# Patient Record
Sex: FEMALE | URBAN - METROPOLITAN AREA
[De-identification: names, ages, dates, MRNs, and addresses within clinical notes are randomized per-mention and may not be internally consistent; named-entity substitution may affect disease eponyms.]

---

## 2021-04-08 ENCOUNTER — TELEPHONE (OUTPATIENT)
Dept: OBGYN CLINIC | Facility: CLINIC | Age: 51
End: 2021-04-08

## 2021-04-08 DIAGNOSIS — N95.2 ATROPHY OF VAGINA: Primary | ICD-10-CM

## 2021-04-08 NOTE — TELEPHONE ENCOUNTER
Pt called to request to have a new scipt processed for E-string and sent to [facility] josue Poon  Pt would like to use coupon and unable to process coupon use through [employer]

## 2021-10-13 ENCOUNTER — TELEPHONE (OUTPATIENT)
Dept: OBGYN CLINIC | Facility: CLINIC | Age: 51
End: 2021-10-13

## 2021-10-13 DIAGNOSIS — N95.2 ATROPHY OF VAGINA: Primary | ICD-10-CM

## 2021-10-13 RX ORDER — ESTRADIOL 0.1 MG/G
1 CREAM VAGINAL 2 TIMES WEEKLY
Qty: 42.5 G | Refills: 2 | Status: SHIPPED | OUTPATIENT
Start: 2021-10-14

## 2022-11-17 PROBLEM — Z13.71 BRCA NEGATIVE: Status: ACTIVE | Noted: 2018-01-01

## 2022-11-17 NOTE — PROGRESS NOTES
Assessment/Plan:    Encounter for gynecological examination (general) (routine) without abnormal findings  Here for well check  Had allergic reactions to Macrobid and Cipro this year with UTIs  Normal breast and pelvic exams  Pap 2020 neg/HPV neg; due by   Mammo order given, last 2021  Colonoscopy 2019, due        Diagnoses and all orders for this visit:    Encounter for gynecological examination (general) (routine) without abnormal findings    Breast cancer screening by mammogram  -     Mammo screening bilateral w 3d & cad; Future    Other orders  -     Liquid-based pap, screening  -     esomeprazole (NexIUM) 20 mg capsule  -     Multiple Vitamins-Minerals (Multivitamin Women) TABS; Take by mouth  -     saccharomyces boulardii (FLORASTOR) 250 mg capsule; Take 250 mg by mouth 2 (two) times a day  -     Calcium Carb-Cholecalciferol (Calcium+D3) 600-20 MG-MCG TABS; Take by mouth          Subjective:      Patient ID: Nereida Grewal is a 45 y o  female  HPI Here for well check  The following portions of the patient's history were reviewed and updated as appropriate:   She  has a past medical history of Asthma (Teenager), BRCA negative (), Fibroid, GERD (gastroesophageal reflux disease), Migraine, and Miscarriage (1997)  She   Patient Active Problem List    Diagnosis Date Noted   • Encounter for gynecological examination (general) (routine) without abnormal findings 2022   • BRCA negative      She  has a past surgical history that includes  section (2003); Myomectomy (2001); Dilation and curettage of uterus (); Dilation and evacuation (); Colonoscopy (); and Mammo (historical) (Bilateral, 06/10/2020)  Her family history includes Osteoarthritis in her father; Uterine cancer (age of onset: 28) in her mother; Uterine cancer (age of onset: 61) in her maternal grandmother  She  reports that she has never smoked   She has never used smokeless tobacco  She reports current alcohol use  She reports that she does not use drugs  Current Outpatient Medications   Medication Sig Dispense Refill   • Calcium Carb-Cholecalciferol (Calcium+D3) 600-20 MG-MCG TABS Take by mouth     • esomeprazole (NexIUM) 20 mg capsule      • Multiple Vitamins-Minerals (Multivitamin Women) TABS Take by mouth     • saccharomyces boulardii (FLORASTOR) 250 mg capsule Take 250 mg by mouth 2 (two) times a day       No current facility-administered medications for this visit  She is allergic to bee venom, ciprofloxin hcl [ciprofloxacin], cortisone, diflunisal, and nitrofurantoin       Review of Systems  No PMB, breast, bladder, bowel changes   No new persistent pain, bloating, early satiety or pelvic pressure    Objective:      /68 (BP Location: Left arm, Patient Position: Sitting, Cuff Size: Standard)   Ht 5' 2" (1 575 m)   Wt 59 4 kg (131 lb)   Breastfeeding No   BMI 23 96 kg/m²          Physical Exam    General appearance: no distress, pleasant  Neck: thyroid without nodules or thyromegaly, no palpable adenopathy  Lymph nodes: no palpable adenopathy  Breasts: no masses, nodes or skin changes  Abdomen: soft, non tender, no palpable masses  Pelvic exam: normal atrophic external genitalia, urethral meatus normal, vagina atrophic without lesions, cervix atrophic without lesions, uterus small, non tender, no adnexal masses, non tender  Rectal exam: normal sphincter tone, no masses, RV confirms above

## 2022-11-18 ENCOUNTER — ANNUAL EXAM (OUTPATIENT)
Dept: OBGYN CLINIC | Facility: CLINIC | Age: 52
End: 2022-11-18

## 2022-11-18 VITALS
HEIGHT: 62 IN | DIASTOLIC BLOOD PRESSURE: 68 MMHG | BODY MASS INDEX: 24.11 KG/M2 | SYSTOLIC BLOOD PRESSURE: 120 MMHG | WEIGHT: 131 LBS

## 2022-11-18 DIAGNOSIS — Z01.419 ENCOUNTER FOR GYNECOLOGICAL EXAMINATION (GENERAL) (ROUTINE) WITHOUT ABNORMAL FINDINGS: Primary | ICD-10-CM

## 2022-11-18 DIAGNOSIS — Z12.31 BREAST CANCER SCREENING BY MAMMOGRAM: ICD-10-CM

## 2022-11-18 RX ORDER — SACCHAROMYCES BOULARDII 250 MG
250 CAPSULE ORAL 2 TIMES DAILY
COMMUNITY

## 2022-11-18 RX ORDER — PHENOL 1.4 %
AEROSOL, SPRAY (ML) MUCOUS MEMBRANE
COMMUNITY

## 2022-11-18 RX ORDER — GINGER ROOT/GINGER ROOT EXT 262.5 MG
CAPSULE ORAL
COMMUNITY

## 2022-11-18 NOTE — ASSESSMENT & PLAN NOTE
Here for well check  Had allergic reactions to Macrobid and Cipro this year with UTIs  Normal breast and pelvic exams     Pap 1/2020 neg/HPV neg; due by 2025  Mammo order given, last 7/2021  Colonoscopy 2019, due 2029

## 2022-11-18 NOTE — LETTER
November 18, 2022     Michael Gaines MD  [facility] [address]    Patient: Dipesh Villaseñor   YOB: 1970   Date of Visit: 11/18/2022       Dear Dr Arthur Hawkins: Thank you for referring Dipesh Villaseñor to me for evaluation  Below are my notes for this consultation  If you have questions, please do not hesitate to call me  I look forward to following your patient along with you  Sincerely,        Jessica Espinoza MD        CC: No Recipients  Jessica Espinoza MD  11/18/2022  9:41 AM  Sign when Signing Visit  Assessment/Plan:    Encounter for gynecological examination (general) (routine) without abnormal findings  Here for well check  Had allergic reactions to Macrobid and Cipro this year with UTIs  Normal breast and pelvic exams  Pap 1/2020 neg/HPV neg; due by 2025  Mammo order given, last 7/2021  Colonoscopy 2019, due 2029      Diagnoses and all orders for this visit:    Encounter for gynecological examination (general) (routine) without abnormal findings    Breast cancer screening by mammogram  -     Mammo screening bilateral w 3d & cad; Future    Other orders  -     Liquid-based pap, screening  -     esomeprazole (NexIUM) 20 mg capsule  -     Multiple Vitamins-Minerals (Multivitamin Women) TABS; Take by mouth  -     saccharomyces boulardii (FLORASTOR) 250 mg capsule; Take 250 mg by mouth 2 (two) times a day  -     Calcium Carb-Cholecalciferol (Calcium+D3) 600-20 MG-MCG TABS; Take by mouth         Subjective:     Patient ID: Dipesh Villaseñor is a 45 y o  female  HPI Here for well check  The following portions of the patient's history were reviewed and updated as appropriate:   She  has a past medical history of Asthma (Teenager), BRCA negative (2018), Fibroid, GERD (gastroesophageal reflux disease), Migraine, and Miscarriage (March 1997)    She   Patient Active Problem List    Diagnosis Date Noted   • Encounter for gynecological examination (general) (routine) without abnormal findings 2022   • BRCA negative      She  has a past surgical history that includes  section (2003); Myomectomy (2001); Dilation and curettage of uterus (); Dilation and evacuation (); Colonoscopy (); and Mammo (historical) (Bilateral, 06/10/2020)  Her family history includes Osteoarthritis in her father; Uterine cancer (age of onset: 28) in her mother; Uterine cancer (age of onset: 61) in her maternal grandmother  She  reports that she has never smoked  She has never used smokeless tobacco  She reports current alcohol use  She reports that she does not use drugs  Current Outpatient Medications   Medication Sig Dispense Refill   • Calcium Carb-Cholecalciferol (Calcium+D3) 600-20 MG-MCG TABS Take by mouth     • esomeprazole (NexIUM) 20 mg capsule      • Multiple Vitamins-Minerals (Multivitamin Women) TABS Take by mouth     • saccharomyces boulardii (FLORASTOR) 250 mg capsule Take 250 mg by mouth 2 (two) times a day       No current facility-administered medications for this visit  She is allergic to bee venom, ciprofloxin hcl [ciprofloxacin], cortisone, diflunisal, and nitrofurantoin       Review of Systems No PMB, breast, bladder, bowel changes   No new persistent pain, bloating, early satiety or pelvic pressure    Objective:      /68 (BP Location: Left arm, Patient Position: Sitting, Cuff Size: Standard)   Ht 5' 2" (1 575 m)   Wt 59 4 kg (131 lb)   Breastfeeding No   BMI 23 96 kg/m²         Physical Exam   General appearance: no distress, pleasant  Neck: thyroid without nodules or thyromegaly, no palpable adenopathy  Lymph nodes: no palpable adenopathy  Breasts: no masses, nodes or skin changes  Abdomen: soft, non tender, no palpable masses  Pelvic exam: normal atrophic external genitalia, urethral meatus normal, vagina atrophic without lesions, cervix atrophic without lesions, uterus small, non tender, no adnexal masses, non tender  Rectal exam: normal sphincter tone, no masses, RV confirms above

## 2022-12-30 DIAGNOSIS — Z12.31 BREAST CANCER SCREENING BY MAMMOGRAM: ICD-10-CM

## 2024-01-11 NOTE — PROGRESS NOTES
Assessment/Plan:    Encounter for gynecological examination (general) (routine) without abnormal findings  Here for well check, no breast or gyn complaints.  Normal breast and pelvic exams.  Last pap 2020 neg/HPV neg; due next time  Mammo order given, last 22  Colonoscopy , due   Dexa orders given for weight <127 lbs. Takes calcium and vit D.       Diagnoses and all orders for this visit:    Encounter for gynecological examination (general) (routine) without abnormal findings    BRCA negative    Encounter for screening mammogram for malignant neoplasm of breast  -     Mammo screening bilateral w 3d & cad; Future  -     Mammo screening bilateral w 3d & cad; Future    Asymptomatic menopausal state  -     DXA bone density spine hip and pelvis; Future          Subjective:      Patient ID: Marisa Mckeon is a 53 y.o. female.    HPI Here for well check.    The following portions of the patient's history were reviewed and updated as appropriate: She  has a past medical history of Asthma (Teenager), BRCA negative (), Fibroid, GERD (gastroesophageal reflux disease), History of screening mammography (2022), Migraine, and Miscarriage (1997).  She   Patient Active Problem List    Diagnosis Date Noted    Encounter for gynecological examination (general) (routine) without abnormal findings 2022    BRCA negative      She  has a past surgical history that includes  section (2003); Myomectomy (2001); Dilation and curettage of uterus (); Dilation and evacuation (); Colonoscopy (); and Mammo (historical) (Bilateral, 06/10/2020).  Her family history includes Osteoarthritis in her father; Uterine cancer (age of onset: 35) in her mother; Uterine cancer (age of onset: 60) in her maternal grandmother.  She  reports that she has never smoked. She has never used smokeless tobacco. She reports that she does not currently use alcohol. She reports that she does not use  "drugs.  Current Outpatient Medications   Medication Sig Dispense Refill    Calcium Carb-Cholecalciferol (Calcium+D3) 600-20 MG-MCG TABS Take by mouth      esomeprazole (NexIUM) 20 mg capsule       Multiple Vitamins-Minerals (Multivitamin Women) TABS Take by mouth      saccharomyces boulardii (FLORASTOR) 250 mg capsule Take 250 mg by mouth 2 (two) times a day       No current facility-administered medications for this visit.     She is allergic to bee venom, ciprofloxin hcl [ciprofloxacin], cortisone, diflunisal, and nitrofurantoin..    Review of Systems  No PMB, breast, bladder, bowel changes. No new persistent pain, bloating, early satiety or pelvic pressure      Objective:      /68   Ht 5' 2\" (1.575 m)   Wt 57.3 kg (126 lb 6.4 oz)   Breastfeeding No   BMI 23.12 kg/m²          Physical Exam    General appearance: no distress, pleasant  Neck: thyroid without nodules or thyromegaly, no palpable adenopathy  Lymph nodes: no palpable adenopathy  Breasts: no masses, nodes or skin changes  Abdomen: soft, non tender, no palpable masses  Pelvic exam: normal atrophic external genitalia, urethral meatus normal, vagina atrophic without lesions, cervix atrophic without lesions, uterus small, non tender, no adnexal masses, non tender  Rectal exam: normal sphincter tone, no masses, RV confirms above    "

## 2024-01-12 ENCOUNTER — OFFICE VISIT (OUTPATIENT)
Dept: OBGYN CLINIC | Facility: CLINIC | Age: 54
End: 2024-01-12
Payer: COMMERCIAL

## 2024-01-12 VITALS
HEIGHT: 62 IN | SYSTOLIC BLOOD PRESSURE: 110 MMHG | WEIGHT: 126.4 LBS | BODY MASS INDEX: 23.26 KG/M2 | DIASTOLIC BLOOD PRESSURE: 68 MMHG

## 2024-01-12 DIAGNOSIS — Z01.419 ENCOUNTER FOR GYNECOLOGICAL EXAMINATION (GENERAL) (ROUTINE) WITHOUT ABNORMAL FINDINGS: Primary | ICD-10-CM

## 2024-01-12 DIAGNOSIS — Z12.31 ENCOUNTER FOR SCREENING MAMMOGRAM FOR MALIGNANT NEOPLASM OF BREAST: ICD-10-CM

## 2024-01-12 DIAGNOSIS — Z78.0 ASYMPTOMATIC MENOPAUSAL STATE: ICD-10-CM

## 2024-01-12 DIAGNOSIS — Z13.71 BRCA NEGATIVE: ICD-10-CM

## 2024-01-12 PROCEDURE — S0612 ANNUAL GYNECOLOGICAL EXAMINA: HCPCS | Performed by: OBSTETRICS & GYNECOLOGY

## 2024-01-12 NOTE — LETTER
January 12, 2024     Yesica Foster MD  [address] [facility] [address]    Patient: Marisa Mckeon   YOB: 1970   Date of Visit: 1/12/2024       Dear Dr. Foster:    Marisa Mckeon was in today for her annual gyn exam. Below are my notes for this consultation.    If you have questions, please do not hesitate to call me. I look forward to following your patient along with you.         Sincerely,        Divina Owens MD        CC: No Recipients    Divina Owens MD  1/12/2024 10:54 AM  Sign when Signing Visit  Assessment/Plan:    Encounter for gynecological examination (general) (routine) without abnormal findings  Here for well check, no breast or gyn complaints.  Normal breast and pelvic exams.  Last pap 1/2020 neg/HPV neg; due next time  Mammo order given, last 12/23/22  Colonoscopy 2019, due 2029  Dexa orders given for weight <127 lbs. Takes calcium and vit D.       Diagnoses and all orders for this visit:    Encounter for gynecological examination (general) (routine) without abnormal findings    BRCA negative    Encounter for screening mammogram for malignant neoplasm of breast  -     Mammo screening bilateral w 3d & cad; Future  -     Mammo screening bilateral w 3d & cad; Future    Asymptomatic menopausal state  -     DXA bone density spine hip and pelvis; Future          Subjective:      Patient ID: Marisa Mckeon is a 53 y.o. female.    HPI Here for well check.    The following portions of the patient's history were reviewed and updated as appropriate: She  has a past medical history of Asthma (Teenager), BRCA negative (2018), Fibroid, GERD (gastroesophageal reflux disease), History of screening mammography (12/23/2022), Migraine, and Miscarriage (March 1997).  She   Patient Active Problem List    Diagnosis Date Noted   • Encounter for gynecological examination (general) (routine) without abnormal findings 11/18/2022   • BRCA negative 2018     She  has a past surgical history that includes  " section (2003); Myomectomy (2001); Dilation and curettage of uterus (); Dilation and evacuation (); Colonoscopy (); and Mammo (historical) (Bilateral, 06/10/2020).  Her family history includes Osteoarthritis in her father; Uterine cancer (age of onset: 35) in her mother; Uterine cancer (age of onset: 60) in her maternal grandmother.  She  reports that she has never smoked. She has never used smokeless tobacco. She reports that she does not currently use alcohol. She reports that she does not use drugs.  Current Outpatient Medications   Medication Sig Dispense Refill   • Calcium Carb-Cholecalciferol (Calcium+D3) 600-20 MG-MCG TABS Take by mouth     • esomeprazole (NexIUM) 20 mg capsule      • Multiple Vitamins-Minerals (Multivitamin Women) TABS Take by mouth     • saccharomyces boulardii (FLORASTOR) 250 mg capsule Take 250 mg by mouth 2 (two) times a day       No current facility-administered medications for this visit.     She is allergic to bee venom, ciprofloxin hcl [ciprofloxacin], cortisone, diflunisal, and nitrofurantoin..    Review of Systems  No PMB, breast, bladder, bowel changes. No new persistent pain, bloating, early satiety or pelvic pressure      Objective:      /68   Ht 5' 2\" (1.575 m)   Wt 57.3 kg (126 lb 6.4 oz)   Breastfeeding No   BMI 23.12 kg/m²          Physical Exam    General appearance: no distress, pleasant  Neck: thyroid without nodules or thyromegaly, no palpable adenopathy  Lymph nodes: no palpable adenopathy  Breasts: no masses, nodes or skin changes  Abdomen: soft, non tender, no palpable masses  Pelvic exam: normal atrophic external genitalia, urethral meatus normal, vagina atrophic without lesions, cervix atrophic without lesions, uterus small, non tender, no adnexal masses, non tender  Rectal exam: normal sphincter tone, no masses, RV confirms above    "

## 2024-01-12 NOTE — PATIENT INSTRUCTIONS
Return to office in one year unless having any problems such as breast changes, bleeding, new persistent pain, new progressive bloating, new problems eating (getting full to quickly) or new constant urinary pressure that does not resolve in one week.    Call in six months to schedule your annual visit.    Continue to strive for 1200 mg of calcium and 1000 IU of vitamin D daily in diet and supplements combined for your bone health.  You can only absorb 600 mg of calcium at a time. Avoid excess calcium as this may adversely effect your heart.  There are 400 mg of calcium in an 8 oz serving of dairy.

## 2024-01-12 NOTE — ASSESSMENT & PLAN NOTE
Here for well check, no breast or gyn complaints.  Normal breast and pelvic exams.  Last pap 1/2020 neg/HPV neg; due next time  Mammo order given, last 12/23/22  Colonoscopy 2019, due 2029  Dexa orders given for weight <127 lbs. Takes calcium and vit D.

## 2024-02-21 PROBLEM — Z01.419 ENCOUNTER FOR GYNECOLOGICAL EXAMINATION (GENERAL) (ROUTINE) WITHOUT ABNORMAL FINDINGS: Status: RESOLVED | Noted: 2022-11-18 | Resolved: 2024-02-21

## 2024-02-23 DIAGNOSIS — Z12.31 ENCOUNTER FOR SCREENING MAMMOGRAM FOR MALIGNANT NEOPLASM OF BREAST: ICD-10-CM

## 2024-07-02 ENCOUNTER — OFFICE VISIT (OUTPATIENT)
Dept: OBGYN CLINIC | Facility: CLINIC | Age: 54
End: 2024-07-02
Payer: COMMERCIAL

## 2024-07-02 ENCOUNTER — NURSE TRIAGE (OUTPATIENT)
Age: 54
End: 2024-07-02

## 2024-07-02 VITALS
HEIGHT: 62 IN | DIASTOLIC BLOOD PRESSURE: 70 MMHG | WEIGHT: 115.4 LBS | BODY MASS INDEX: 21.23 KG/M2 | SYSTOLIC BLOOD PRESSURE: 112 MMHG

## 2024-07-02 DIAGNOSIS — N95.0 POSTMENOPAUSAL BLEEDING: Primary | ICD-10-CM

## 2024-07-02 PROCEDURE — 58100 BIOPSY OF UTERUS LINING: CPT | Performed by: OBSTETRICS & GYNECOLOGY

## 2024-07-02 PROCEDURE — 99213 OFFICE O/P EST LOW 20 MIN: CPT | Performed by: OBSTETRICS & GYNECOLOGY

## 2024-07-02 RX ORDER — CETIRIZINE HYDROCHLORIDE 5 MG/1
5 TABLET, CHEWABLE ORAL DAILY
COMMUNITY

## 2024-07-02 NOTE — TELEPHONE ENCOUNTER
"[place] and noticed pink vaginal spotting approx. 6/17. Patient reports she is 4 years post menopause.Bleeding never heavy, never bright red, mostly light pink or brown spotting.Denies any pain or discomfort. Denies urinary symptoms.  Appointment scheduled for today.  Reviewed s/sx to call back; soaking more than 1-2 pads per hour, severe cramping or other concerning symptoms. Patient verbalized understanding and thankful for appointment.     Reason for Disposition   Postmenopausal vaginal bleeding   Age > 39 years with irregular or excessive bleeding    Answer Assessment - Initial Assessment Questions  1. AMOUNT: \"Describe the bleeding that you are having.\" \"How much bleeding is there?\" Spotting     - SPOTTING: spotting, or pinkish / brownish mucous discharge; does not fill panti-liner or pad     - MILD:  less than 1 pad / hour; less than patient's usual menstrual bleeding    - MODERATE: 1-2 pads / hour; 1 menstrual cup every 6 hours; small-medium blood clots (e.g., pea, grape, small coin)    - SEVERE: soaking 2 or more pads/hour for 2 or more hours; 1 menstrual cup every 2 hours; bleeding not contained by pads or continuous red blood from vagina; large blood clots (e.g., golf ball, large coin)       Spotting mild   2. ONSET: \"When did the bleeding begin?\" \"Is it continuing now?\"      6/17/24  3. MENOPAUSE: \"When was your last menstrual period?\"       4 years ago  4. ABDOMINAL PAIN: \"Do you have any pain?\" \"How bad is the pain?\"  (e.g., Scale 1-10; mild, moderate, or severe)    - MILD (1-3): doesn't interfere with normal activities, abdomen soft and not tender to touch     - MODERATE (4-7): interferes with normal activities or awakens from sleep, tender to touch     - SEVERE (8-10): excruciating pain, doubled over, unable to do any normal activities       none  5. BLOOD THINNERS: \"Do you take any blood thinners?\" (e.g., Coumadin/warfarin, Pradaxa/dabigatran, aspirin)      " "denies  6. HORMONES: \"Are you taking any hormone medications, prescription or OTC?\" (e.g., birth control pills, estrogen)      denies  7. CAUSE: \"What do you think is causing the bleeding?\" (e.g., recent gyn surgery, recent gyn procedure; known bleeding disorder, uterine cancer)        unsure  8. HEMODYNAMIC STATUS: \"Are you weak or feeling lightheaded?\" If Yes, ask: \"Can you stand and walk normally?\"        denies  9. OTHER SYMPTOMS: \"What other symptoms are you having with the bleeding?\" (e.g., back pain, burning with urination, fever)      denies    Protocols used: Vaginal Bleeding - Postmenopausal-ADULT-AH, Vaginal Bleeding - Abnormal-ADULT-OH    "

## 2024-07-02 NOTE — PATIENT INSTRUCTIONS
- expect spotting or light bleeding for a few days following biopsy   - Motrin 600mg every 6 hours or Tylenol 650mg every 6-8 hours for cramping or pain   - nothing in your vagina until bleeding completely stops or at least 2-3 days   - call office if fever, severe pelvic pain or foul vaginal odor

## 2024-07-02 NOTE — PROGRESS NOTES
[facility] - [address], [address], [place] [address]    Assessment/Plan:  1. Postmenopausal bleeding  Comments:  spotting with wiping for 2 weeks  A/P:   Reviewed possible PMB causes include vaginal atrophy, endometrial polyps, endometrial hyperplasia/cancer, estrogen withdrawal and cervical bleeding.  Discussed ultrasound for evaluation of endometrial lining with biopsy of lining.  Patient agreed to endometrial biopsy today.  Was performed with some discomfort but she tolerated it well.  Will communicate via [email or web address] for results.  Orders:  -     US pelvis complete w transvaginal; Future  -     Pathology Report  -     Endometrial biopsy      Subjective:   Marisa Mckeon is a 53 y.o.  female.    HPI:   Marisa presents for new postmenopausal bleeding.    She reports periods stopped at age 47yo and she has not had bleeding since.  For last two weeks pink or brown with wiping or in underwear.  Now daily.  No pain.  States it's not her urine.  Sexually active - last time last week.  No pain.  Bleeding did not occur right after intercourse.  No new partners.        Gyn History  No LMP recorded. Patient is postmenopausal.       Last pap smear: 2020    She  reports being sexually active and has had partner(s) who are male. She reports using the following method of birth control/protection: Post-menopausal.       OB History      Past Medical History:  Teenager: Asthma      Comment:  Allergy induced  2018: BRCA negative      Comment:  Negative full Myriad panel 2018, negative [facility]                testing.  No date: Fibroid      Comment:  History of small fibrodis one large one removed in 2001  No date: GERD (gastroesophageal reflux disease)  2022: History of screening mammography      Comment:  Bi-Rads 2.  No date: Migraine      Comment:  Sinus/allergy related also changes in weather brings on                migraine  1997: Miscarriage     Past  "Surgical History:  2003:  SECTION  2019: COLONOSCOPY      Comment:  Due 2029: DILATION AND CURETTAGE OF UTERUS  1997: DILATION AND EVACUATION  06/10/2020: MAMMO (HISTORICAL); Bilateral      Comment:  Av mar BI-RADS 2 Benign findings  2001: MYOMECTOMY     Social History     Tobacco Use    Smoking status: Never    Smokeless tobacco: Never   Vaping Use    Vaping status: Never Used   Substance Use Topics    Alcohol use: Not Currently     Comment: A glass of wine occassionally    Drug use: Never          Current Outpatient Medications:     cetirizine (ZyrTEC) 5 MG chewable tablet, Chew 5 mg daily, Disp: , Rfl:     Multiple Vitamins-Minerals (Multivitamin Women) TABS, Take by mouth, Disp: , Rfl:     She is allergic to bee venom, ciprofloxin hcl [ciprofloxacin], cortisone, diflunisal, and nitrofurantoin..    ROS: Review of Systems   Constitutional: Negative.    Gastrointestinal: Negative.    Genitourinary:  Positive for vaginal bleeding (pmb).   Psychiatric/Behavioral: Negative.         Objective:  /70 (BP Location: Left arm, Patient Position: Sitting, Cuff Size: Standard)   Ht 5' 2\" (1.575 m)   Wt 52.3 kg (115 lb 6.4 oz)   BMI 21.11 kg/m²      Physical Exam  Constitutional:       Appearance: Normal appearance.   Genitourinary:     General: Normal vulva.      Vagina: Normal. No vaginal discharge or bleeding.      Cervix: Normal.      Uterus: Normal. Not enlarged and not tender.       Adnexa:         Right: No mass or tenderness.          Left: No mass or tenderness.        Rectum: No external hemorrhoid.   Neurological:      Mental Status: She is alert.   Psychiatric:         Mood and Affect: Mood normal.         Behavior: Behavior normal.           Endometrial biopsy    Date/Time: 2024 5:30 PM    Performed by: Jess Mclaughlin MD  Authorized by: Jess Mclaughlin MD  Universal Protocol:  Consent: Verbal consent obtained.  Risks and benefits: risks, benefits and alternatives were " discussed  Consent given by: patient  Patient understanding: patient states understanding of the procedure being performed  Patient identity confirmed: verbally with patient    Indication:     Indications: Post-menopausal bleeding      Chronicity of post-menopausal bleeding:  New    Progression of post-menopausal bleeding:  Worsening  Procedure:     Procedure: endometrial biopsy with Pipelle      A bivalve speculum was placed in the vagina: yes      Cervix cleaned and prepped: yes      A paracervical block was performed: no      The cervix was dilated: no      Uterus sounded: yes      Uterus sound depth (cm):  7    Specimen collected: specimen collected and sent to pathology      Patient tolerated procedure well with no complications: yes    Comments:     Procedure comments:  One pass performed with minimal mucous or tissue.  Asked patient if I could perform another, she declined due to discomfort.

## 2024-07-10 LAB
PATH REPORT.SITE OF ORIGIN SPEC: NORMAL
PAYMENT PROCEDURE: NORMAL
SL AMB .: NORMAL

## 2024-07-24 PROBLEM — N95.0 POSTMENOPAUSAL BLEEDING: Status: ACTIVE | Noted: 2024-07-24

## 2024-07-25 NOTE — PROGRESS NOTES
Assessment/Plan:    Postmenopausal bleeding  52 yo  with intermittent postmenopausal spotting for six weeks occasionally associated with cramping.   Imaging reviewed with 4 mm EMS on 24 ultrasound and benign endometrial biopsy from 24 showing atrophic endometrium.     Exam with hyperemic atrophy at introitus, suspected etiology of spotting. Will re-start external estradiol cream 2-3 times per week.   Discussed option for hysteroscopy with D&C due to significant family history of uterine cancer in mother @35 and maternal grandmother @60. Agrees to proceed with further evaluation with D&C.     R&B of procedure reviewed including bleeding, infection, uterine perforation (quoted 1:200-500) and potential need for laparoscopy with perforation. Questions answered and consent signed.       Diagnoses and all orders for this visit:    Postmenopausal bleeding    Vaginal atrophy  -     estradiol (ESTRACE VAGINAL) 0.1 mg/g vaginal cream; Pea sized external application 2-3 times weekly Do not start before 2024.    Other orders  -     saccharomyces boulardii (FLORASTOR) 250 mg capsule; Take 250 mg by mouth 2 (two) times a day        Subjective:      Patient ID: Marisa Mckeon is a 53 y.o. female.    HPI here in follow up to continued intermittent postmenopausal spotting.    The following portions of the patient's history were reviewed and updated as appropriate: She  has a past medical history of Asthma (Teenager), BRCA negative (), Fibroid, GERD (gastroesophageal reflux disease), History of screening mammography (2022), Migraine, and Miscarriage (1997).  She   Patient Active Problem List    Diagnosis Date Noted    Postmenopausal bleeding 2024    BRCA negative      She  has a past surgical history that includes  section (2003); Myomectomy (2001); Dilation and curettage of uterus (); Dilation and evacuation (); Colonoscopy (); and Mammo (historical)  "(Bilateral, 06/10/2020).  Her family history includes Osteoarthritis in her father; Uterine cancer (age of onset: 35) in her mother; Uterine cancer (age of onset: 60) in her maternal grandmother.  She  reports that she has never smoked. She has never been exposed to tobacco smoke. She has never used smokeless tobacco. She reports that she does not currently use alcohol. She reports that she does not use drugs.  Current Outpatient Medications   Medication Sig Dispense Refill    cetirizine (ZyrTEC) 5 MG chewable tablet Chew 5 mg daily      [START ON 7/29/2024] estradiol (ESTRACE VAGINAL) 0.1 mg/g vaginal cream Pea sized external application 2-3 times weekly Do not start before July 29, 2024. 42.5 g 1    Multiple Vitamins-Minerals (Multivitamin Women) TABS Take by mouth      saccharomyces boulardii (FLORASTOR) 250 mg capsule Take 250 mg by mouth 2 (two) times a day       No current facility-administered medications for this visit.     She is allergic to bee venom, ciprofloxin hcl [ciprofloxacin], cortisone, diflunisal, and nitrofurantoin..    Review of Systems  +PMB, intermittent cramping    Objective:      /58 (BP Location: Left arm, Patient Position: Sitting, Cuff Size: Standard)   Ht 5' 2\" (1.575 m)   Wt 51.2 kg (112 lb 12.8 oz)   BMI 20.63 kg/m²          Physical Exam    Appears well, no apparent distress.   Does not appear anxious or depressed.  HEENT: normocephalic, EOEMI, oral pharynx clear  Lungs CTA  CVS RRR  LE without edema or calf tenderness  Pelvic: atrophic external genitalia with introital posterior hyperemia. Normal urethral meatus, vagina atrophic without lesions. Cervix atrophic, no lesions, no bleeding. Uterus small, nontender. No adnexal masses or tenderness.   Rectal deferred.     DATA: 7/18/24 AV uterus 6.3 cm with EMS 4 mm, indistinct. R ov not seen; L ov 2.4 cm, WNL. No masses or free fluid.   7/2/24 Endo bx:ATROPHIC ENDOMETRIUM. NO HYPERPLASIA OR CARCINOMA.   "

## 2024-07-26 ENCOUNTER — CONSULT (OUTPATIENT)
Dept: OBGYN CLINIC | Facility: CLINIC | Age: 54
End: 2024-07-26
Payer: COMMERCIAL

## 2024-07-26 VITALS
BODY MASS INDEX: 20.76 KG/M2 | WEIGHT: 112.8 LBS | HEIGHT: 62 IN | DIASTOLIC BLOOD PRESSURE: 58 MMHG | SYSTOLIC BLOOD PRESSURE: 112 MMHG

## 2024-07-26 DIAGNOSIS — N95.2 VAGINAL ATROPHY: ICD-10-CM

## 2024-07-26 DIAGNOSIS — Z80.49 FAMILY HISTORY OF UTERINE CANCER: ICD-10-CM

## 2024-07-26 DIAGNOSIS — N95.0 POSTMENOPAUSAL BLEEDING: Primary | ICD-10-CM

## 2024-07-26 PROCEDURE — 99214 OFFICE O/P EST MOD 30 MIN: CPT | Performed by: OBSTETRICS & GYNECOLOGY

## 2024-07-26 RX ORDER — SACCHAROMYCES BOULARDII 250 MG
250 CAPSULE ORAL 2 TIMES DAILY
COMMUNITY

## 2024-07-26 RX ORDER — ESTRADIOL 0.1 MG/G
CREAM VAGINAL
Qty: 42.5 G | Refills: 1 | Status: SHIPPED | OUTPATIENT
Start: 2024-07-29

## 2024-07-26 NOTE — ASSESSMENT & PLAN NOTE
54 yo  with intermittent postmenopausal spotting for six weeks occasionally associated with cramping.   Imaging reviewed with 4 mm EMS on 24 ultrasound and benign endometrial biopsy from 24 showing atrophic endometrium.     Exam with hyperemic atrophy at introitus, suspected etiology of spotting. Will re-start external estradiol cream 2-3 times per week.   Discussed option for hysteroscopy with D&C due to significant family history of uterine cancer in mother @35 and maternal grandmother @60. Agrees to proceed with further evaluation with D&C.     R&B of procedure reviewed including bleeding, infection, uterine perforation (quoted 1:200-500) and potential need for laparoscopy with perforation. Questions answered and consent signed.

## 2024-07-26 NOTE — PATIENT INSTRUCTIONS
Katelyn will contact you with available dates.   Use a small amount of the estradiol cream externally 2-3 times per week.

## 2024-07-26 NOTE — LETTER
2024     Yesica Foster MD  [address] [facility] [id number]    Patient: Marisa Mckeon   YOB: 1970   Date of Visit: 2024       Dear Dr. Foster:    Marisa Mckeon was in to see me today for evaluation. Below are my notes for this consultation.    If you have questions, please do not hesitate to call me. I look forward to following your patient along with you.         Sincerely,        Divina Owens MD        CC: No Recipients    Divina Owens MD  2024  9:31 AM  Sign when Signing Visit  Assessment/Plan:    Postmenopausal bleeding  52 yo  with intermittent postmenopausal spotting for six weeks occasionally associated with cramping.   Imaging reviewed with 4 mm EMS on 24 ultrasound and benign endometrial biopsy from 24 showing atrophic endometrium.     Exam with hyperemic atrophy at introitus, suspected etiology of spotting. Will re-start external estradiol cream 2-3 times per week.   Discussed option for hysteroscopy with D&C due to significant family history of uterine cancer in mother @35 and maternal grandmother @60. Agrees to proceed with further evaluation with D&C.     R&B of procedure reviewed including bleeding, infection, uterine perforation (quoted 1:200-500) and potential need for laparoscopy with perforation. Questions answered and consent signed.       Diagnoses and all orders for this visit:    Postmenopausal bleeding    Vaginal atrophy  -     estradiol (ESTRACE VAGINAL) 0.1 mg/g vaginal cream; Pea sized external application 2-3 times weekly Do not start before 2024.    Other orders  -     saccharomyces boulardii (FLORASTOR) 250 mg capsule; Take 250 mg by mouth 2 (two) times a day        Subjective:      Patient ID: Marisa Mckeon is a 53 y.o. female.    HPI here in follow up to continued intermittent postmenopausal spotting.    The following portions of the patient's history were reviewed and updated as appropriate: She  has a past medical  "history of Asthma (Teenager), BRCA negative (), Fibroid, GERD (gastroesophageal reflux disease), History of screening mammography (2022), Migraine, and Miscarriage (1997).  She   Patient Active Problem List    Diagnosis Date Noted   • Postmenopausal bleeding 2024   • BRCA negative      She  has a past surgical history that includes  section (2003); Myomectomy (2001); Dilation and curettage of uterus (); Dilation and evacuation (); Colonoscopy (); and Mammo (historical) (Bilateral, 06/10/2020).  Her family history includes Osteoarthritis in her father; Uterine cancer (age of onset: 35) in her mother; Uterine cancer (age of onset: 60) in her maternal grandmother.  She  reports that she has never smoked. She has never been exposed to tobacco smoke. She has never used smokeless tobacco. She reports that she does not currently use alcohol. She reports that she does not use drugs.  Current Outpatient Medications   Medication Sig Dispense Refill   • cetirizine (ZyrTEC) 5 MG chewable tablet Chew 5 mg daily     • [START ON 2024] estradiol (ESTRACE VAGINAL) 0.1 mg/g vaginal cream Pea sized external application 2-3 times weekly Do not start before 2024. 42.5 g 1   • Multiple Vitamins-Minerals (Multivitamin Women) TABS Take by mouth     • saccharomyces boulardii (FLORASTOR) 250 mg capsule Take 250 mg by mouth 2 (two) times a day       No current facility-administered medications for this visit.     She is allergic to bee venom, ciprofloxin hcl [ciprofloxacin], cortisone, diflunisal, and nitrofurantoin..    Review of Systems  +PMB, intermittent cramping    Objective:      /58 (BP Location: Left arm, Patient Position: Sitting, Cuff Size: Standard)   Ht 5' 2\" (1.575 m)   Wt 51.2 kg (112 lb 12.8 oz)   BMI 20.63 kg/m²          Physical Exam    Appears well, no apparent distress.   Does not appear anxious or depressed.  HEENT: normocephalic, EOEMI, oral " pharynx clear  Lungs CTA  CVS RRR  LE without edema or calf tenderness  Pelvic: atrophic external genitalia with introital posterior hyperemia. Normal urethral meatus, vagina atrophic without lesions. Cervix atrophic, no lesions, no bleeding. Uterus small, nontender. No adnexal masses or tenderness.   Rectal deferred.     DATA: 7/18/24 AV uterus 6.3 cm with EMS 4 mm, indistinct. R ov not seen; L ov 2.4 cm, WNL. No masses or free fluid.   7/2/24 Endo bx:ATROPHIC ENDOMETRIUM. NO HYPERPLASIA OR CARCINOMA.

## 2024-08-02 ENCOUNTER — TELEPHONE (OUTPATIENT)
Dept: OBGYN CLINIC | Facility: CLINIC | Age: 54
End: 2024-08-02

## 2024-08-02 NOTE — TELEPHONE ENCOUNTER
----- Message from Divina Owens MD sent at 2024  9:32 AM EDT -----  Regarding: Hysteroscopy, D&C with Keltonhion  Brandin Okeefey. Please contact Marisa for dates.   Thanks!    [facility] GYN Department  Surgery Scheduling Sheet    Patient Name: Marisa Mckeon  : 1970    Provider: Divina Owens MD     Needed: no; Language: N/A    Procedure: exam under anesthesia and operative hysteroscopy with Symphion    Diagnosis: PMB, family history of uterine cancer    Special Needs or Equipment: Symphion    Anesthesia: IV sedation with anesthesia, paracervical block    Length of stay: outpatient  Does patient have comorbid conditions that will require close perioperative monitoring prior to safe discharge: no    The patient has comorbid conditions that will require close perioperative monitoring prior to safe discharge, including N/A.   This may require acute care beyond the usual and routine recovery period. As such, inpatient admission post-operatively is expected and appropriate, and anticipated hospital length of stay will be >2 midnights.    Pre-Admission Testing Needed: yes   Labs that should be ordered: cbc, cmp, and EKG    Order PAT that is recommended in prep for procedure?: No    Medical Clearance Needed: no; Provider: N/A    MA Form Signed (tubals/hysterectomy): Not Indicated    Surgical Drink Given: no     How many days out of work: 1 day(s)     How many days no drivin day(s)       Is pre op appt needed?  no  Interval for post op appt: not needed    For Surgical Scheduler:     Surgery Scheduled On:  [place]: [facility]    Pre-op Appt: 24  Post op Appt:  Consult/Medical clearance appt:

## 2024-08-12 ENCOUNTER — APPOINTMENT (OUTPATIENT)
Dept: LAB | Facility: HOSPITAL | Age: 54
End: 2024-08-12
Payer: COMMERCIAL

## 2024-08-12 DIAGNOSIS — N84.0 ENDOMETRIAL POLYP: ICD-10-CM

## 2024-08-14 ENCOUNTER — TELEPHONE (OUTPATIENT)
Age: 54
End: 2024-08-14

## 2024-08-14 LAB
ATRIAL RATE: 71 BPM
P AXIS: 73 DEGREES
PR INTERVAL: 144 MS
QRS AXIS: 68 DEGREES
QRSD INTERVAL: 68 MS
QT INTERVAL: 400 MS
QTC INTERVAL: 434 MS
T WAVE AXIS: 60 DEGREES
VENTRICULAR RATE: 71 BPM

## 2024-08-14 PROCEDURE — 93010 ELECTROCARDIOGRAM REPORT: CPT | Performed by: INTERNAL MEDICINE

## 2024-08-14 NOTE — TELEPHONE ENCOUNTER
Patient called she is receiving phone calls from lab cor to completed blood work.  Appears that all preadmission testing completed on 8/12/24.  Patient verifying everything complete.

## 2024-08-15 ENCOUNTER — TELEPHONE (OUTPATIENT)
Age: 54
End: 2024-08-15

## 2024-08-15 ENCOUNTER — TELEPHONE (OUTPATIENT)
Dept: OBGYN CLINIC | Facility: CLINIC | Age: 54
End: 2024-08-15

## 2024-08-15 DIAGNOSIS — R94.31 PRE-OPERATIVE CARDIOVASCULAR EXAM, NEW EKG ABNORMALITIES C/W ISCHEMIA: Primary | ICD-10-CM

## 2024-08-15 DIAGNOSIS — Z01.810 PRE-OPERATIVE CARDIOVASCULAR EXAM, NEW EKG ABNORMALITIES C/W ISCHEMIA: Primary | ICD-10-CM

## 2024-08-15 NOTE — TELEPHONE ENCOUNTER
"Hello,    The following message was sent via e-mail to the leadership team:     Please advise if you can help facilitate the following overbook request:    Patient Name: Marisa Mckeon    Patient MRN: [id number]    Call back #:     Insurance: [employer] Plan [id number]    Department:Cardiology    Speciality: General Cardiology    Reason for overbook request: STAT REFERRAL    Comments (Write \"N/a\" if no comments): Pt has GYN procedure scheduled for 8/22/24 & needs CC STAT    Requested doctor and location: Any / Marie[facility]    Date of current appointment: 8/28/24      Thank you.       "

## 2024-08-15 NOTE — TELEPHONE ENCOUNTER
I spoke to pt regarding her EKG results and that Dr. Owens wanted her to have cardiac clearance before her upcoming surgery. I gave her the number for [facility]'s Cardiology and asked her to call an schedule and appt and let me know when it is for.     I also asked Dr. Owens to put a referral in for pt to be seen.

## 2024-08-16 NOTE — PROGRESS NOTES
Cardiology Consultation     Marisa Mckeon  [id number]  1970  HEART & VASCULAR [facility]  [address] [address]  1. Preoperative cardiovascular examination  POCT ECG        Patient Active Problem List   Diagnosis   • BRCA negative   • Postmenopausal bleeding       HPI patient here for cardiology evaluation and preop clearance.  Patient has had issues with postmenopausal bleeding and is scheduled to have a Hysteroscopy with D&C.  EKG 8/12/2024 read as NSR and  Anterior infarct. Patient with no history of heart disease.  EKG today demonstrates NSR and is a normal tracing.  Patient has had no chest pain or significant dyspnea.  She does not smoke.  There is no family history of heart disease.    PMH-  Past Medical History:   Diagnosis Date   • Asthma Teenager    Allergy induced   • BRCA negative 2018    Negative full Myriad panel 1/2018, negative [employer] testing.   • Fibroid     History of small fibrodis one large one removed in July 2001   • GERD (gastroesophageal reflux disease)    • History of screening mammography 12/23/2022    Bi-Rads 2.   • Migraine     Sinus/allergy related also changes in weather brings on migraine   • Miscarriage March 1997        SOCIAL HISTORY-  Social History     Socioeconomic History   • Marital status: /Civil Union     Spouse name: Not on file   • Number of children: Not on file   • Years of education: Not on file   • Highest education level: Not on file   Occupational History   • Occupation: Teacher   Tobacco Use   • Smoking status: Never     Passive exposure: Never   • Smokeless tobacco: Never   Vaping Use   • Vaping status: Never Used   Substance and Sexual Activity   • Alcohol use: Not Currently     Comment: A glass of wine occassionally   • Drug use: Never   • Sexual activity: Yes     Partners: Male     Birth control/protection: Post-menopausal     Comment: no new  partner in past year   Other Topics Concern   • Not on file   Social History Narrative   • Not on file     Social Determinants of Health     Financial Resource Strain: Not on file   Food Insecurity: Not on file   Transportation Needs: Not on file   Physical Activity: Not on file   Stress: Not on file   Social Connections: Not on file   Intimate Partner Violence: Not on file   Housing Stability: Not on file        FAMILY HISTORY-  Family History   Problem Relation Age of Onset   • Uterine cancer Mother 35   • Osteoarthritis Father    • Uterine cancer Maternal Grandmother 60   • Breast cancer Neg Hx    • Ovarian cancer Neg Hx    • Colon cancer Neg Hx        SURGICAL HISTORY-  Past Surgical History:   Procedure Laterality Date   •  SECTION  2003   • COLONOSCOPY  2019    Due    • DILATION AND CURETTAGE OF UTERUS     • DILATION AND EVACUATION     • MAMMO (HISTORICAL) Bilateral 06/10/2020    [address] hos BI-RADS 2 Benign findings   • MYOMECTOMY  2001         Current Outpatient Medications:   •  cetirizine (ZyrTEC) 5 MG chewable tablet, Chew 5 mg daily, Disp: , Rfl:   •  estradiol (ESTRACE VAGINAL) 0.1 mg/g vaginal cream, Pea sized external application 2-3 times weekly Do not start before 2024., Disp: 42.5 g, Rfl: 1  •  Multiple Vitamins-Minerals (Multivitamin Women) TABS, Take by mouth, Disp: , Rfl:   •  Probiotic Product (Probiotic 10 Ultra Strength) CAPS, , Disp: , Rfl:   •  saccharomyces boulardii (FLORASTOR) 250 mg capsule, Take 250 mg by mouth 2 (two) times a day, Disp: , Rfl:   Allergies   Allergen Reactions   • Bee Venom Anaphylaxis   • Ciprofloxin Hcl [Ciprofloxacin] Hives     Ear redness / burning   • Cortisone Hives   • Diflunisal Other (See Comments)   • Nitrofurantoin Diarrhea, Headache, Hives, Itching and Vomiting     Vomit, hives, itchy       Vitals:    24 1253   BP: 114/72   BP Location: Left arm   Patient Position: Sitting   Cuff Size: Standard   Pulse: 74  "  SpO2: 99%   Weight: 52.8 kg (116 lb 4.8 oz)   Height: 5' 2\" (1.575 m)         Review of Systems:  Review of Systems   All other systems reviewed and are negative.      Physical Exam:  Physical Exam  Vitals reviewed.   Constitutional:       Appearance: She is well-developed.   HENT:      Head: Normocephalic and atraumatic.   Eyes:      Conjunctiva/sclera: Conjunctivae normal.      Pupils: Pupils are equal, round, and reactive to light.   Cardiovascular:      Rate and Rhythm: Normal rate.      Heart sounds: Normal heart sounds.   Pulmonary:      Effort: Pulmonary effort is normal.      Breath sounds: Normal breath sounds.   Musculoskeletal:      Cervical back: Normal range of motion and neck supple.   Skin:     General: Skin is warm and dry.   Neurological:      Mental Status: She is alert and oriented to person, place, and time.         Discussion/Summary: Patient is asymptomatic from a cardiac point of view.  Patient's EKG today is normal.  She is okay to proceed with surgery without further cardiac testing.  I have asked the patient to call if there is a problem in the interim.  I will see her as needed going forward.  "

## 2024-08-19 ENCOUNTER — OFFICE VISIT (OUTPATIENT)
Dept: CARDIOLOGY CLINIC | Facility: CLINIC | Age: 54
End: 2024-08-19
Payer: COMMERCIAL

## 2024-08-19 VITALS
DIASTOLIC BLOOD PRESSURE: 72 MMHG | OXYGEN SATURATION: 99 % | SYSTOLIC BLOOD PRESSURE: 114 MMHG | HEIGHT: 62 IN | BODY MASS INDEX: 21.4 KG/M2 | WEIGHT: 116.3 LBS | HEART RATE: 74 BPM

## 2024-08-19 DIAGNOSIS — Z01.810 PREOPERATIVE CARDIOVASCULAR EXAMINATION: Primary | ICD-10-CM

## 2024-08-19 DIAGNOSIS — R94.31 PRE-OPERATIVE CARDIOVASCULAR EXAM, NEW EKG ABNORMALITIES C/W ISCHEMIA: ICD-10-CM

## 2024-08-19 DIAGNOSIS — Z01.810 PRE-OPERATIVE CARDIOVASCULAR EXAM, NEW EKG ABNORMALITIES C/W ISCHEMIA: ICD-10-CM

## 2024-08-19 PROCEDURE — 93000 ELECTROCARDIOGRAM COMPLETE: CPT | Performed by: INTERNAL MEDICINE

## 2024-08-19 PROCEDURE — 99204 OFFICE O/P NEW MOD 45 MIN: CPT | Performed by: INTERNAL MEDICINE

## 2024-08-19 PROCEDURE — NC001 PR NO CHARGE: Performed by: OBSTETRICS & GYNECOLOGY

## 2024-08-19 RX ORDER — VITAMIN E (DL,TOCOPHERYL ACET) 45 MG/0.25
DROPS ORAL
COMMUNITY

## 2024-08-19 NOTE — H&P
Postmenopausal bleeding  54 yo  with intermittent postmenopausal spotting for six weeks occasionally associated with cramping.   Imaging reviewed with 4 mm EMS on 24 ultrasound and benign endometrial biopsy from 24 showing atrophic endometrium.      Exam with hyperemic atrophy at introitus, suspected etiology of spotting. Will re-start external estradiol cream 2-3 times per week.   Discussed option for hysteroscopy with D&C due to significant family history of uterine cancer in mother @35 and maternal grandmother @60. Agrees to proceed with further evaluation with D&C.      R&B of procedure reviewed including bleeding, infection, uterine perforation (quoted 1:200-500) and potential need for laparoscopy with perforation. Questions answered and consent signed.     Assessment  Diagnoses and all orders for this visit:     Postmenopausal bleeding     Vaginal atrophy  -     estradiol (ESTRACE VAGINAL) 0.1 mg/g vaginal cream; Pea sized external application 2-3 times weekly Do not start before 2024.     Other orders  -     saccharomyces boulardii (FLORASTOR) 250 mg capsule; Take 250 mg by mouth 2 (two) times a day       Subjective  Patient ID: Marisa Mckeon is a 53 y.o. female.     HPI here in follow up to continued intermittent postmenopausal spotting.     The following portions of the patient's history were reviewed and updated as appropriate: She  has a past medical history of Asthma (Teenager), BRCA negative (), Fibroid, GERD (gastroesophageal reflux disease), History of screening mammography (2022), Migraine, and Miscarriage (1997).  She        Patient Active Problem List     Diagnosis Date Noted    Postmenopausal bleeding 2024    BRCA negative       She  has a past surgical history that includes  section (2003); Myomectomy (2001); Dilation and curettage of uterus (); Dilation and evacuation (); Colonoscopy (); and Mammo (historical)  "(Bilateral, 06/10/2020).  Her family history includes Osteoarthritis in her father; Uterine cancer (age of onset: 35) in her mother; Uterine cancer (age of onset: 60) in her maternal grandmother.  She  reports that she has never smoked. She has never been exposed to tobacco smoke. She has never used smokeless tobacco. She reports that she does not currently use alcohol. She reports that she does not use drugs.  Current Rx          Current Outpatient Medications   Medication Sig Dispense Refill    cetirizine (ZyrTEC) 5 MG chewable tablet Chew 5 mg daily        [START ON 7/29/2024] estradiol (ESTRACE VAGINAL) 0.1 mg/g vaginal cream Pea sized external application 2-3 times weekly Do not start before July 29, 2024. 42.5 g 1    Multiple Vitamins-Minerals (Multivitamin Women) TABS Take by mouth        saccharomyces boulardii (FLORASTOR) 250 mg capsule Take 250 mg by mouth 2 (two) times a day          No current facility-administered medications for this visit.         She is allergic to bee venom, ciprofloxin hcl [ciprofloxacin], cortisone, diflunisal, and nitrofurantoin..     Review of Systems   +PMB, intermittent cramping     Objective:  Vitals and exam from 7/26/24      /58 (BP Location: Left arm, Patient Position: Sitting, Cuff Size: Standard)   Ht 5' 2\" (1.575 m)   Wt 51.2 kg (112 lb 12.8 oz)   BMI 20.63 kg/m²      Objective  Physical Exam  Appears well, no apparent distress.   Does not appear anxious or depressed.  HEENT: normocephalic, EOEMI, oral pharynx clear  Lungs CTA  CVS RRR  LE without edema or calf tenderness  Pelvic: atrophic external genitalia with introital posterior hyperemia. Normal urethral meatus, vagina atrophic without lesions. Cervix atrophic, no lesions, no bleeding. Uterus small, nontender. No adnexal masses or tenderness.   Rectal deferred.      DATA: 7/18/24 AV uterus 6.3 cm with EMS 4 mm, indistinct. R ov not seen; L ov 2.4 cm, WNL. No masses or free fluid.   7/2/24 Endo bx:ATROPHIC " ENDOMETRIUM. NO HYPERPLASIA OR CARCINOMA.     8/12/24 Normal pre op CMP; CBC  Creatinine 0.59; Hg 13.7    8/12/24   Normal sinus rhythm  Cannot rule out Anterior infarct , age undetermined  Abnormal ECG  No previous ECGs available  Cardiology appt scheduled 8/19/24

## 2024-08-20 RX ORDER — ALBUTEROL SULFATE 90 UG/1
2 AEROSOL, METERED RESPIRATORY (INHALATION) EVERY 6 HOURS PRN
COMMUNITY

## 2024-08-20 NOTE — PRE-PROCEDURE INSTRUCTIONS
Pre-Surgery Instructions:   Medication Instructions    albuterol (PROVENTIL HFA,VENTOLIN HFA) 90 mcg/act inhaler Uses PRN- OK to take day of surgery    cetirizine (ZyrTEC) 5 MG chewable tablet Take day of surgery.    estradiol (ESTRACE VAGINAL) 0.1 mg/g vaginal cream Hold evening prior    Multiple Vitamins-Minerals (Multivitamin Women) TABS Stop taking 1 day prior to surgery.    Probiotic Product (Probiotic 10 Ultra Strength) CAPS Stop taking 1 day prior to surgery.    Medication instructions for day surgery reviewed. Please use only a sip of water to take your instructed medications. Avoid all over the counter vitamins, supplements and NSAIDS for one week prior to surgery per anesthesia guidelines. Tylenol is ok to take as needed.     You will receive a call one business day prior to surgery with an arrival time and hospital directions. If your surgery is scheduled on a Monday, the hospital will be calling you on the Friday prior to your surgery. If you have not heard from anyone by 8pm, please call the hospital supervisor through the hospital  at [phone]. ([place] [phone] or [place] [phone]).    Do not eat or drink anything after midnight the night before your surgery, including candy, mints, lifesavers, or chewing gum. Do not drink alcohol 24hrs before your surgery. Try not to smoke at least 24hrs before your surgery.       Follow the pre surgery showering instructions as listed in the “My Surgical Experience Booklet” or otherwise provided by your surgeon's office. Do not use a blade to shave the surgical area 1 week before surgery. It is okay to use a clean electric clippers up to 24 hours before surgery. Do not apply any lotions, creams, including makeup, cologne, deodorant, or perfumes after showering on the day of your surgery. Do not use dry shampoo, hair spray, hair gel, or any type of hair products.     No contact lenses, eye make-up, or artificial eyelashes. Remove nail polish,  including gel polish, and any artificial, gel, or acrylic nails if possible. Remove all jewelry including rings and body piercing jewelry.     Wear causal clothing that is easy to take on and off. Consider your type of surgery.    Keep any valuables, jewelry, piercings at home. Please bring any specially ordered equipment (sling, braces) if indicated.    Arrange for a responsible person to drive you to and from the hospital on the day of your surgery. Please confirm the visitor policy for the day of your procedure when you receive your phone call with an arrival time.     Call the surgeon's office with any new illnesses, exposures, or additional questions prior to surgery.    Please reference your “My Surgical Experience Booklet” for additional information to prepare for your upcoming surgery.

## 2024-08-21 ENCOUNTER — ANESTHESIA EVENT (OUTPATIENT)
Dept: PERIOP | Facility: HOSPITAL | Age: 54
End: 2024-08-21
Payer: COMMERCIAL

## 2024-08-22 ENCOUNTER — ANESTHESIA (OUTPATIENT)
Dept: PERIOP | Facility: HOSPITAL | Age: 54
End: 2024-08-22
Payer: COMMERCIAL

## 2024-08-22 ENCOUNTER — HOSPITAL ENCOUNTER (OUTPATIENT)
Facility: HOSPITAL | Age: 54
Setting detail: OUTPATIENT SURGERY
Discharge: HOME | End: 2024-08-22
Attending: OBSTETRICS & GYNECOLOGY | Admitting: OBSTETRICS & GYNECOLOGY
Payer: COMMERCIAL

## 2024-08-22 VITALS
HEART RATE: 64 BPM | BODY MASS INDEX: 20.85 KG/M2 | WEIGHT: 114 LBS | DIASTOLIC BLOOD PRESSURE: 63 MMHG | RESPIRATION RATE: 12 BRPM | OXYGEN SATURATION: 99 % | TEMPERATURE: 98 F | SYSTOLIC BLOOD PRESSURE: 111 MMHG

## 2024-08-22 DIAGNOSIS — N84.0 ENDOMETRIAL POLYP: ICD-10-CM

## 2024-08-22 PROBLEM — N95.0 POSTMENOPAUSAL BLEEDING: Status: RESOLVED | Noted: 2024-07-24 | Resolved: 2024-08-22

## 2024-08-22 PROCEDURE — 58558 HYSTEROSCOPY BIOPSY: CPT | Performed by: OBSTETRICS & GYNECOLOGY

## 2024-08-22 PROCEDURE — 88305 TISSUE EXAM BY PATHOLOGIST: CPT | Performed by: STUDENT IN AN ORGANIZED HEALTH CARE EDUCATION/TRAINING PROGRAM

## 2024-08-22 RX ORDER — PROPOFOL 10 MG/ML
INJECTION, EMULSION INTRAVENOUS CONTINUOUS PRN
Status: DISCONTINUED | OUTPATIENT
Start: 2024-08-22 | End: 2024-08-22

## 2024-08-22 RX ORDER — SODIUM CHLORIDE, SODIUM LACTATE, POTASSIUM CHLORIDE, CALCIUM CHLORIDE 600; 310; 30; 20 MG/100ML; MG/100ML; MG/100ML; MG/100ML
125 INJECTION, SOLUTION INTRAVENOUS CONTINUOUS
Status: DISCONTINUED | OUTPATIENT
Start: 2024-08-22 | End: 2024-08-22 | Stop reason: HOSPADM

## 2024-08-22 RX ORDER — LIDOCAINE HYDROCHLORIDE 10 MG/ML
INJECTION, SOLUTION EPIDURAL; INFILTRATION; INTRACAUDAL; PERINEURAL AS NEEDED
Status: DISCONTINUED | OUTPATIENT
Start: 2024-08-22 | End: 2024-08-22

## 2024-08-22 RX ORDER — ALBUTEROL SULFATE 0.83 MG/ML
2.5 SOLUTION RESPIRATORY (INHALATION) ONCE AS NEEDED
Status: DISCONTINUED | OUTPATIENT
Start: 2024-08-22 | End: 2024-08-22 | Stop reason: HOSPADM

## 2024-08-22 RX ORDER — LIDOCAINE HYDROCHLORIDE 10 MG/ML
INJECTION, SOLUTION EPIDURAL; INFILTRATION; INTRACAUDAL; PERINEURAL AS NEEDED
Status: DISCONTINUED | OUTPATIENT
Start: 2024-08-22 | End: 2024-08-22 | Stop reason: HOSPADM

## 2024-08-22 RX ORDER — METOCLOPRAMIDE HYDROCHLORIDE 5 MG/ML
INJECTION INTRAMUSCULAR; INTRAVENOUS AS NEEDED
Status: DISCONTINUED | OUTPATIENT
Start: 2024-08-22 | End: 2024-08-22

## 2024-08-22 RX ORDER — DEXAMETHASONE SODIUM PHOSPHATE 10 MG/ML
INJECTION, SOLUTION INTRAMUSCULAR; INTRAVENOUS AS NEEDED
Status: DISCONTINUED | OUTPATIENT
Start: 2024-08-22 | End: 2024-08-22

## 2024-08-22 RX ORDER — MIDAZOLAM HYDROCHLORIDE 2 MG/2ML
INJECTION, SOLUTION INTRAMUSCULAR; INTRAVENOUS AS NEEDED
Status: DISCONTINUED | OUTPATIENT
Start: 2024-08-22 | End: 2024-08-22

## 2024-08-22 RX ORDER — FENTANYL CITRATE/PF 50 MCG/ML
25 SYRINGE (ML) INJECTION
Status: DISCONTINUED | OUTPATIENT
Start: 2024-08-22 | End: 2024-08-22 | Stop reason: HOSPADM

## 2024-08-22 RX ORDER — DIPHENHYDRAMINE HYDROCHLORIDE 50 MG/ML
12.5 INJECTION INTRAMUSCULAR; INTRAVENOUS ONCE AS NEEDED
Status: DISCONTINUED | OUTPATIENT
Start: 2024-08-22 | End: 2024-08-22 | Stop reason: HOSPADM

## 2024-08-22 RX ORDER — SODIUM CHLORIDE, SODIUM LACTATE, POTASSIUM CHLORIDE, CALCIUM CHLORIDE 600; 310; 30; 20 MG/100ML; MG/100ML; MG/100ML; MG/100ML
INJECTION, SOLUTION INTRAVENOUS CONTINUOUS PRN
Status: DISCONTINUED | OUTPATIENT
Start: 2024-08-22 | End: 2024-08-22

## 2024-08-22 RX ORDER — FENTANYL CITRATE 50 UG/ML
INJECTION, SOLUTION INTRAMUSCULAR; INTRAVENOUS AS NEEDED
Status: DISCONTINUED | OUTPATIENT
Start: 2024-08-22 | End: 2024-08-22

## 2024-08-22 RX ADMIN — MIDAZOLAM 2 MG: 1 INJECTION INTRAMUSCULAR; INTRAVENOUS at 08:22

## 2024-08-22 RX ADMIN — FENTANYL CITRATE 25 MCG: 50 INJECTION, SOLUTION INTRAMUSCULAR; INTRAVENOUS at 08:29

## 2024-08-22 RX ADMIN — DEXAMETHASONE SODIUM PHOSPHATE 5 MG: 10 INJECTION, SOLUTION INTRAMUSCULAR; INTRAVENOUS at 08:15

## 2024-08-22 RX ADMIN — PROPOFOL 100 MCG/KG/MIN: 10 INJECTION, EMULSION INTRAVENOUS at 08:28

## 2024-08-22 RX ADMIN — METOCLOPRAMIDE 5 MG: 5 INJECTION, SOLUTION INTRAMUSCULAR; INTRAVENOUS at 08:15

## 2024-08-22 RX ADMIN — SODIUM CHLORIDE, SODIUM LACTATE, POTASSIUM CHLORIDE, AND CALCIUM CHLORIDE: .6; .31; .03; .02 INJECTION, SOLUTION INTRAVENOUS at 08:15

## 2024-08-22 RX ADMIN — SODIUM CHLORIDE, SODIUM LACTATE, POTASSIUM CHLORIDE, AND CALCIUM CHLORIDE 125 ML/HR: .6; .31; .03; .02 INJECTION, SOLUTION INTRAVENOUS at 07:40

## 2024-08-22 RX ADMIN — LIDOCAINE HYDROCHLORIDE 50 MG: 10 INJECTION, SOLUTION EPIDURAL; INFILTRATION; INTRACAUDAL; PERINEURAL at 08:28

## 2024-08-22 RX ADMIN — FENTANYL CITRATE 25 MCG: 50 INJECTION, SOLUTION INTRAMUSCULAR; INTRAVENOUS at 08:15

## 2024-08-22 NOTE — ANESTHESIA POSTPROCEDURE EVALUATION
Post-Op Assessment Note    CV Status:  Stable  Pain Score: 0    Pain management: adequate       Mental Status:  Alert and awake   Hydration Status:  Euvolemic   PONV Controlled:  Controlled   Airway Patency:  Patent     Post Op Vitals Reviewed: Yes    No anethesia notable event occurred.    Staff: JACKY           BP   108/66   temp 98.1   Pulse 58   Resp 14   SpO2 98 ra

## 2024-08-22 NOTE — ANESTHESIA PREPROCEDURE EVALUATION
Procedure:  HYSTEROSCOPY D+C, SYMPHION POLYPECTOMY, EXAM UNDER ANESTHESIA (Uterus)    Relevant Problems   ANESTHESIA (within normal limits)      CARDIO (within normal limits)   (+) Migraine      GI/HEPATIC   (+) GERD (gastroesophageal reflux disease) (controlled)      NEURO/PSYCH   (+) Migraine      PULMONARY   (+) Asthma (Allergy induced  )   (-) Sleep apnea   (-) Smoking   (-) URI (upper respiratory infection)      Physical Exam    Airway    Mallampati score: II  TM Distance: >3 FB  Neck ROM: full     Dental   No notable dental hx     Cardiovascular      Pulmonary      Other Findings  post-pubertal.    Lab Results   Component Value Date    WBC 6.76 08/12/2024    HGB 13.7 08/12/2024     08/12/2024     Lab Results   Component Value Date    SODIUM 139 08/12/2024    K 4.8 08/12/2024    BUN 14 08/12/2024    CREATININE 0.59 (L) 08/12/2024    EGFR 103 08/12/2024     Anesthesia Plan  ASA Score- 2     Anesthesia Type- IV sedation with anesthesia with ASA Monitors.         Additional Monitors:     Airway Plan:     Comment: No zofran given previous reaction with severe itching/hives - pt had one time reaction to a cortisone intraarticular injection, has not been on other steroids but I suspect will tolerate decadron.  OK for reglan, benadryl, phenergan as antiemetics. OK for toradol..       Plan Factors-Exercise tolerance (METS): >4 METS.    Chart reviewed.   Existing labs reviewed. Patient summary reviewed.    Patient is not a current smoker.              Induction- intravenous.    Postoperative Plan-         Informed Consent- Anesthetic plan and risks discussed with patient and spouse.  I personally reviewed this patient with the CRNA. Discussed and agreed on the Anesthesia Plan with the CRNA..

## 2024-08-22 NOTE — DISCHARGE INSTR - AVS FIRST PAGE
Nothing in vagina for 7-10 days: no sex, tampons or douching.  Call for heavy bleeding, fever, pain, any problems.   You may start your ibuprofen today at 3 PM and continue every six hours as needed for cramping. Always take ibuprofen with food and a glass of water. Tylenol between ibuprofen doses can be taken as directed on the bottle if needed.  Call if you do not hear about the results of the pathology in 2 weeks.

## 2024-08-22 NOTE — INTERVAL H&P NOTE
H&P reviewed. After examining the patient I find no changes in the patients condition since the H&P had been written. Last incidence of spotting was 2 weeks ago.    Vitals:    08/22/24 0730   BP: 121/68   Pulse: 70   Resp: 16   Temp: 98.4 °F (36.9 °C)   SpO2: 100%

## 2024-08-22 NOTE — OP NOTE
OPERATIVE REPORT  PATIENT NAME: Marisa Mckeon    :  1970  MRN: [id number]  Pt Location:  OR ROOM 04    SURGERY DATE: 2024    Surgeons and Role:     * Divina Owens MD - Primary    Preop Diagnosis:  Postmenopausal bleeding  Family history uterine cancer    Postop Diagnosis:  Postmenopausal bleeding  Family history uterine cancer    Procedure(s):  HYSTEROSCOPY D+C SYMPHION. EXAM UNDER ANESTHESIA    Specimen(s):  ID Type Source Tests Collected by Time Destination   1 : [facility] Tissue Cervix, Endocervical TISSUE EXAM Divina Owens MD 2024 0835    2 : [facility] Tissue Endometrium TISSUE EXAM Divina Owens MD 2024 0836        Estimated Blood Loss:   Minimal    Drains:  * No LDAs found *    Anesthesia Type:   IV Sedation with Anesthesia, paracervical block    Operative Indications:  Postmenopausal bleeding  Family history of uterine cancer      Operative Findings:  AV uterus sounded 7 cm. Completely atrophic endocervical and endometrial cavities.      Complications:   None    Procedure and Technique:  The patient was brought to the OR and placed in the dorsal lithotomy while awake. After successful IV sedation she was prepped and draped in the usual sterile fashion for vaginal surgery.   A speculum was placed in the vagina and the cervix was grasped with a single tooth tenaculum. A paracervical block was accomplished with 10 cc of plain lidocaine. An endocervical curettage was performed and sent to pathology. The cervix was dilated to accommodate a small hysteroscope. Using saline as the distending medium the endocervical and endometrial cavities were explored with the findings as noted above.  A sharp curettage was performed with retrieval of tissue with polyp forceps. Specimens were sent to pathology.  The tenaculum and speculum were removed. Excellent hemostasis was noted.   All sponge and instrument counts were correct. The patient tolerated the procedure well and was taken to surgical short stay in good  condition.      I was present for the entire procedure.    Patient Disposition:  PACU         SIGNATURE: Divina Owens MD  DATE: August 22, 2024  TIME: 8:47 AM

## 2024-08-27 ENCOUNTER — TELEPHONE (OUTPATIENT)
Dept: OBGYN CLINIC | Facility: CLINIC | Age: 54
End: 2024-08-27

## 2024-08-27 PROCEDURE — 88305 TISSUE EXAM BY PATHOLOGIST: CPT | Performed by: STUDENT IN AN ORGANIZED HEALTH CARE EDUCATION/TRAINING PROGRAM

## 2025-01-10 NOTE — ASSESSMENT & PLAN NOTE
Here for well check, doing well since D&C and with twice weekly external estradiol cream. No staining.  No breast or gyn concerns.  Normal breast and pelvic exams.  Last pap 1/2020 neg/HPV neg; repeated today  Mammo order given, last 2/16/24 BIRADS 2C  Colonoscopy 2019, due 2029  Dexa 2/16/24 - spine T-1.0; left hip T-1.1, left fem neck T-1.9; low risk of fracture. Calcium recs reviewed.

## 2025-01-10 NOTE — PROGRESS NOTES
Name: Marisa Mckeon      : 1970      MRN: [id number]  Encounter Provider: Divina Owens MD  Encounter Date: 2025   Encounter department: [facility] [place]  :  Assessment & Plan  Encounter for gynecological examination (general) (routine) without abnormal findings  Here for well check, doing well since D&C and with twice weekly external estradiol cream. No staining.  No breast or gyn concerns.  Normal breast and pelvic exams.  Last pap 2020 neg/HPV neg; repeated today  Mammo order given, last 24 BIRADS 2C  Colonoscopy , due   Dexa 24 - spine T-1.0; left hip T-1.1, left fem neck T-1.9; low risk of fracture. Calcium recs reviewed.       Encounter for screening mammogram for malignant neoplasm of breast    Orders:    Mammo screening bilateral w 3d and cad; Future    Screening for malignant neoplasm of the cervix    Orders:    IGP, Aptima HPV, Rfx 16/18,45        History of Present Illness   HPI  Marisa Mckeon is a 54 y.o. female who presents for well check.    Review of Systems  No PMB, breast, bladder, bowel changes. No new persistent pain, bloating, early satiety or pelvic pressure    Past Medical History   Past Medical History:   Diagnosis Date    Asthma Teenager    Allergy induced    BRCA negative     Negative full Myriad panel 2018, negative [employer] testing.    Environmental allergies     Fibroid     History of small fibrodis one large one removed in 2001    Fibromyalgia, primary     GERD (gastroesophageal reflux disease)     Migraine     Sinus/allergy related also changes in weather brings on migraine    Miscarriage 1997     Past Surgical History:   Procedure Laterality Date     SECTION  2003    COLONOSCOPY  2019    Due     DILATION AND CURETTAGE OF UTERUS      DILATION AND EVACUATION      MYOMECTOMY  2001    AL HYSTEROSCOPY BX ENDOMETRIUM&/POLYPC W/WO D&C N/A 2024    Procedure: HYSTEROSCOPY D+C  "SYMPHION, EXAM UNDER ANESTHESIA;  Surgeon: Divina Owens MD;  Location:  MAIN OR;  Service: Gynecology     Family History   Problem Relation Age of Onset    Uterine cancer Mother 35    Osteoarthritis Father     No Known Problems Brother     No Known Problems Brother     No Known Problems Daughter     No Known Problems Daughter     No Known Problems Daughter     No Known Problems Daughter     Uterine cancer Maternal Grandmother 60    No Known Problems Maternal Grandfather     Heart disease Paternal Grandmother     Diabetes Paternal Grandfather     Breast cancer Neg Hx     Ovarian cancer Neg Hx     Colon cancer Neg Hx       reports that she has never smoked. She has never been exposed to tobacco smoke. She has never used smokeless tobacco. She reports current alcohol use. She reports that she does not use drugs.  Current Outpatient Medications on File Prior to Visit   Medication Sig Dispense Refill    albuterol (PROVENTIL HFA,VENTOLIN HFA) 90 mcg/act inhaler Inhale 2 puffs every 6 (six) hours as needed for wheezing      Cetirizine HCl 10 MG TBDP Take 1 tablet by mouth daily      estradiol (ESTRACE VAGINAL) 0.1 mg/g vaginal cream Pea sized external application 2-3 times weekly Do not start before July 29, 2024. 42.5 g 1    Multiple Vitamins-Minerals (Multivitamin Women) TABS Take by mouth      Probiotic Product (Probiotic 10 Ultra Strength) CAPS        No current facility-administered medications on file prior to visit.     Allergies   Allergen Reactions    Bee Venom Anaphylaxis    Ciprofloxin Hcl [Ciprofloxacin] Hives     Ear redness / burning    Cortisone Hives    Nitrofurantoin Diarrhea, Headache, Hives, Itching and Vomiting     Vomit, hives, itchy      Zofran [Ondansetron] Hives and Itching    Diflunisal Rash         Objective   /60 (BP Location: Left arm, Patient Position: Sitting, Cuff Size: Standard)   Ht 5' 2.25\" (1.581 m)   Wt 53.9 kg (118 lb 12.8 oz)   BMI 21.55 kg/m²      Physical Exam  General " appearance: no distress, pleasant  Neck: thyroid without nodules or thyromegaly, no palpable adenopathy  Lymph nodes: no palpable adenopathy  Breasts: no masses, nodes or skin changes  Abdomen: soft, non tender, no palpable masses  Pelvic exam: normal atrophic external genitalia, urethral meatus normal, vagina atrophic without lesions, cervix atrophic without lesions, uterus small, non tender, no adnexal masses, non tender  Rectal exam: normal sphincter tone, no masses, RV confirms above

## 2025-01-14 ENCOUNTER — ANNUAL EXAM (OUTPATIENT)
Dept: OBGYN CLINIC | Facility: CLINIC | Age: 55
End: 2025-01-14
Payer: COMMERCIAL

## 2025-01-14 VITALS
WEIGHT: 118.8 LBS | HEIGHT: 62 IN | SYSTOLIC BLOOD PRESSURE: 110 MMHG | BODY MASS INDEX: 21.86 KG/M2 | DIASTOLIC BLOOD PRESSURE: 60 MMHG

## 2025-01-14 DIAGNOSIS — Z12.4 SCREENING FOR MALIGNANT NEOPLASM OF THE CERVIX: ICD-10-CM

## 2025-01-14 DIAGNOSIS — Z01.419 ENCOUNTER FOR GYNECOLOGICAL EXAMINATION (GENERAL) (ROUTINE) WITHOUT ABNORMAL FINDINGS: Primary | ICD-10-CM

## 2025-01-14 DIAGNOSIS — Z12.31 ENCOUNTER FOR SCREENING MAMMOGRAM FOR MALIGNANT NEOPLASM OF BREAST: ICD-10-CM

## 2025-01-14 PROCEDURE — S0612 ANNUAL GYNECOLOGICAL EXAMINA: HCPCS | Performed by: OBSTETRICS & GYNECOLOGY

## 2025-01-14 NOTE — PATIENT INSTRUCTIONS
Return to office in one year unless having any problems such as breast changes, bleeding, new persistent pain, new progressive bloating, new problems eating (getting full to quickly) or new constant urinary pressure that does not resolve in one week.    Continue to strive for 1200 mg of calcium and 1000 IU of vitamin D daily in diet and supplements combined for your bone health.  You can only absorb 600 mg of calcium at a time. Avoid excess calcium as this may adversely effect your heart.  There are 400 mg of calcium in an 8 oz serving of dairy.  Arnold milk has 600 mg of calcium in an 8 oz serving.

## 2025-01-14 NOTE — LETTER
2025     Yesica Foster MD  [address] [facility] [address]    Patient: Marisa Mckeon   YOB: 1970   Date of Visit: 2025       Dear Dr. Foster:    Marisa Mckeon was in today for her annual gyn exam. Below are my notes for this visit.    If you have questions, please do not hesitate to call me. I look forward to following your patient along with you.         Sincerely,        Divina Owens MD        CC: No Recipients    Divina Owens MD  2025 10:24 AM  Sign when Signing Visit  Name: Marisa Mckeon      : 1970      MRN: [id number]  Encounter Provider: Divina Owens MD  Encounter Date: 2025   Encounter department: [facility] [place]  :  Assessment & Plan  Encounter for gynecological examination (general) (routine) without abnormal findings  Here for well check, doing well since D&C and with twice weekly external estradiol cream. No staining.  No breast or gyn concerns.  Normal breast and pelvic exams.  Last pap 2020 neg/HPV neg; repeated today  Mammo order given, last 24 BIRADS 2C  Colonoscopy , due   Dexa 24 - spine T-1.0; left hip T-1.1, left fem neck T-1.9; low risk of fracture. Calcium recs reviewed.       Encounter for screening mammogram for malignant neoplasm of breast    Orders:  •  Mammo screening bilateral w 3d and cad; Future    Screening for malignant neoplasm of the cervix    Orders:  •  IGP, Aptima HPV, Rfx 16/18,45        History of Present Illness  HPI  Marisa Mckeon is a 54 y.o. female who presents for well check.    Review of Systems  No PMB, breast, bladder, bowel changes. No new persistent pain, bloating, early satiety or pelvic pressure    Past Medical History  Past Medical History:   Diagnosis Date   • Asthma Teenager    Allergy induced   • BRCA negative     Negative full Myriad panel 2018, negative [employer] testing.   • Environmental allergies    • Fibroid     History of small fibrodis one  large one removed in 2001   • Fibromyalgia, primary    • GERD (gastroesophageal reflux disease)    • Migraine     Sinus/allergy related also changes in weather brings on migraine   • Miscarriage 1997     Past Surgical History:   Procedure Laterality Date   •  SECTION  2003   • COLONOSCOPY  2019   • DILATION AND CURETTAGE OF UTERUS     • DILATION AND EVACUATION     • MYOMECTOMY  2001   • NM HYSTEROSCOPY BX ENDOMETRIUM&/POLYPC W/WO D&C N/A 2024    Procedure: HYSTEROSCOPY D+C SYMPHION, EXAM UNDER ANESTHESIA;  Surgeon: Divina Owens MD;  Location:  MAIN OR;  Service: Gynecology     Family History   Problem Relation Age of Onset   • Uterine cancer Mother 35   • Osteoarthritis Father    • No Known Problems Brother    • No Known Problems Brother    • No Known Problems Daughter    • No Known Problems Daughter    • No Known Problems Daughter    • No Known Problems Daughter    • Uterine cancer Maternal Grandmother 60   • No Known Problems Maternal Grandfather    • Heart disease Paternal Grandmother    • Diabetes Paternal Grandfather    • Breast cancer Neg Hx    • Ovarian cancer Neg Hx    • Colon cancer Neg Hx       reports that she has never smoked. She has never been exposed to tobacco smoke. She has never used smokeless tobacco. She reports current alcohol use. She reports that she does not use drugs.  Current Outpatient Medications on File Prior to Visit   Medication Sig Dispense Refill   • albuterol (PROVENTIL HFA,VENTOLIN HFA) 90 mcg/act inhaler Inhale 2 puffs every 6 (six) hours as needed for wheezing     • Cetirizine HCl 10 MG TBDP Take 1 tablet by mouth daily     • estradiol (ESTRACE VAGINAL) 0.1 mg/g vaginal cream Pea sized external application 2-3 times weekly Do not start before 2024. 42.5 g 1   • Multiple Vitamins-Minerals (Multivitamin Women) TABS Take by mouth     • Probiotic Product (Probiotic 10 Ultra Strength) CAPS        No current  "facility-administered medications on file prior to visit.     Allergies   Allergen Reactions   • Bee Venom Anaphylaxis   • Ciprofloxin Hcl [Ciprofloxacin] Hives     Ear redness / burning   • Cortisone Hives   • Nitrofurantoin Diarrhea, Headache, Hives, Itching and Vomiting     Vomit, hives, itchy     • Zofran [Ondansetron] Hives and Itching   • Diflunisal Rash         Objective  /60 (BP Location: Left arm, Patient Position: Sitting, Cuff Size: Standard)   Ht 5' 2.25\" (1.581 m)   Wt 53.9 kg (118 lb 12.8 oz)   BMI 21.55 kg/m²      Physical Exam  General appearance: no distress, pleasant  Neck: thyroid without nodules or thyromegaly, no palpable adenopathy  Lymph nodes: no palpable adenopathy  Breasts: no masses, nodes or skin changes  Abdomen: soft, non tender, no palpable masses  Pelvic exam: normal atrophic external genitalia, urethral meatus normal, vagina atrophic without lesions, cervix atrophic without lesions, uterus small, non tender, no adnexal masses, non tender  Rectal exam: normal sphincter tone, no masses, RV confirms above      "

## 2025-01-17 LAB
CYTOLOGIST CVX/VAG CYTO: NORMAL
DX ICD CODE: NORMAL
HPV GENOTYPE REFLEX: NORMAL
HPV I/H RISK 4 DNA CVX QL PROBE+SIG AMP: NEGATIVE
OTHER STN SPEC: NORMAL
PATH REPORT.FINAL DX SPEC: NORMAL
SL AMB NOTE:: NORMAL
SL AMB SPECIMEN ADEQUACY: NORMAL
SL AMB TEST METHODOLOGY: NORMAL

## 2025-01-20 ENCOUNTER — RESULTS FOLLOW-UP (OUTPATIENT)
Dept: OBGYN CLINIC | Facility: CLINIC | Age: 55
End: 2025-01-20

## (undated) DEVICE — GLOVE SRG LF STRL BGL SKNSNS 7.5 PF

## (undated) DEVICE — ARTHROSCOPY FLOOR MAT

## (undated) DEVICE — PAD SANITARY

## (undated) DEVICE — LUBRICANT JELLY SURGILUBE TUBE 4OZ FLIP TOP

## (undated) DEVICE — 1820 FOAM BLOCK NEEDLE COUNTER: Brand: DEVON

## (undated) DEVICE — TISSUE REMOVAL SYSTEM FLUID MANAGEMENT ACCESSORIES: Brand: SYMPHION

## (undated) DEVICE — POV-IOD SOLUTION 4OZ BT

## (undated) DEVICE — NEEDLE SPINAL 22G X 3.5IN  QUINCKE

## (undated) DEVICE — SYRINGE 10ML LL CONTROL TOP

## (undated) DEVICE — STRL ALLENTOWN HYSTEROSCOPY PK: Brand: CARDINAL HEALTH

## (undated) DEVICE — PREMIUM DRY TRAY LF: Brand: MEDLINE INDUSTRIES, INC.

## (undated) DEVICE — LIGHT GLOVE GREEN